# Patient Record
Sex: FEMALE | Race: OTHER | NOT HISPANIC OR LATINO | URBAN - METROPOLITAN AREA
[De-identification: names, ages, dates, MRNs, and addresses within clinical notes are randomized per-mention and may not be internally consistent; named-entity substitution may affect disease eponyms.]

---

## 2021-06-05 ENCOUNTER — INPATIENT (INPATIENT)
Facility: HOSPITAL | Age: 71
LOS: 0 days | Discharge: HOME | End: 2021-06-06
Attending: HOSPITALIST | Admitting: HOSPITALIST
Payer: MEDICARE

## 2021-06-05 VITALS
OXYGEN SATURATION: 97 % | WEIGHT: 149.91 LBS | TEMPERATURE: 98 F | RESPIRATION RATE: 18 BRPM | HEIGHT: 63 IN | HEART RATE: 108 BPM | DIASTOLIC BLOOD PRESSURE: 81 MMHG | SYSTOLIC BLOOD PRESSURE: 158 MMHG

## 2021-06-05 LAB
ALBUMIN SERPL ELPH-MCNC: 4.5 G/DL — SIGNIFICANT CHANGE UP (ref 3.5–5.2)
ALP SERPL-CCNC: 117 U/L — HIGH (ref 30–115)
ALT FLD-CCNC: 16 U/L — SIGNIFICANT CHANGE UP (ref 0–41)
ANION GAP SERPL CALC-SCNC: 17 MMOL/L — HIGH (ref 7–14)
APPEARANCE UR: CLEAR — SIGNIFICANT CHANGE UP
AST SERPL-CCNC: 34 U/L — SIGNIFICANT CHANGE UP (ref 0–41)
BASOPHILS # BLD AUTO: 0.05 K/UL — SIGNIFICANT CHANGE UP (ref 0–0.2)
BASOPHILS NFR BLD AUTO: 0.2 % — SIGNIFICANT CHANGE UP (ref 0–1)
BILIRUB DIRECT SERPL-MCNC: <0.2 MG/DL — SIGNIFICANT CHANGE UP (ref 0–0.2)
BILIRUB INDIRECT FLD-MCNC: >0.3 MG/DL — SIGNIFICANT CHANGE UP (ref 0.2–1.2)
BILIRUB SERPL-MCNC: 0.5 MG/DL — SIGNIFICANT CHANGE UP (ref 0.2–1.2)
BILIRUB UR-MCNC: NEGATIVE — SIGNIFICANT CHANGE UP
BLD GP AB SCN SERPL QL: SIGNIFICANT CHANGE UP
BUN SERPL-MCNC: 13 MG/DL — SIGNIFICANT CHANGE UP (ref 10–20)
CALCIUM SERPL-MCNC: 9.9 MG/DL — SIGNIFICANT CHANGE UP (ref 8.5–10.1)
CHLORIDE SERPL-SCNC: 101 MMOL/L — SIGNIFICANT CHANGE UP (ref 98–110)
CO2 SERPL-SCNC: 20 MMOL/L — SIGNIFICANT CHANGE UP (ref 17–32)
COLOR SPEC: SIGNIFICANT CHANGE UP
CREAT SERPL-MCNC: 0.7 MG/DL — SIGNIFICANT CHANGE UP (ref 0.7–1.5)
DIFF PNL FLD: NEGATIVE — SIGNIFICANT CHANGE UP
EOSINOPHIL # BLD AUTO: 0.01 K/UL — SIGNIFICANT CHANGE UP (ref 0–0.7)
EOSINOPHIL NFR BLD AUTO: 0 % — SIGNIFICANT CHANGE UP (ref 0–8)
GLUCOSE SERPL-MCNC: 120 MG/DL — HIGH (ref 70–99)
GLUCOSE UR QL: NEGATIVE — SIGNIFICANT CHANGE UP
HCT VFR BLD CALC: 40.3 % — SIGNIFICANT CHANGE UP (ref 37–47)
HGB BLD-MCNC: 12.5 G/DL — SIGNIFICANT CHANGE UP (ref 12–16)
IMM GRANULOCYTES NFR BLD AUTO: 0.3 % — SIGNIFICANT CHANGE UP (ref 0.1–0.3)
KETONES UR-MCNC: ABNORMAL
LACTATE SERPL-SCNC: 2.4 MMOL/L — HIGH (ref 0.7–2)
LACTATE SERPL-SCNC: 3.2 MMOL/L — HIGH (ref 0.7–2)
LEUKOCYTE ESTERASE UR-ACNC: NEGATIVE — SIGNIFICANT CHANGE UP
LIDOCAIN IGE QN: 19 U/L — SIGNIFICANT CHANGE UP (ref 7–60)
LYMPHOCYTES # BLD AUTO: 0.65 K/UL — LOW (ref 1.2–3.4)
LYMPHOCYTES # BLD AUTO: 3.2 % — LOW (ref 20.5–51.1)
MCHC RBC-ENTMCNC: 25.5 PG — LOW (ref 27–31)
MCHC RBC-ENTMCNC: 31 G/DL — LOW (ref 32–37)
MCV RBC AUTO: 82.1 FL — SIGNIFICANT CHANGE UP (ref 81–99)
MONOCYTES # BLD AUTO: 1.5 K/UL — HIGH (ref 0.1–0.6)
MONOCYTES NFR BLD AUTO: 7.3 % — SIGNIFICANT CHANGE UP (ref 1.7–9.3)
NEUTROPHILS # BLD AUTO: 18.34 K/UL — HIGH (ref 1.4–6.5)
NEUTROPHILS NFR BLD AUTO: 89 % — HIGH (ref 42.2–75.2)
NITRITE UR-MCNC: NEGATIVE — SIGNIFICANT CHANGE UP
NRBC # BLD: 0 /100 WBCS — SIGNIFICANT CHANGE UP (ref 0–0)
PH UR: 6.5 — SIGNIFICANT CHANGE UP (ref 5–8)
PLATELET # BLD AUTO: 338 K/UL — SIGNIFICANT CHANGE UP (ref 130–400)
POTASSIUM SERPL-MCNC: 5.5 MMOL/L — HIGH (ref 3.5–5)
POTASSIUM SERPL-SCNC: 5.5 MMOL/L — HIGH (ref 3.5–5)
PROT SERPL-MCNC: 7.7 G/DL — SIGNIFICANT CHANGE UP (ref 6–8)
PROT UR-MCNC: SIGNIFICANT CHANGE UP
RBC # BLD: 4.91 M/UL — SIGNIFICANT CHANGE UP (ref 4.2–5.4)
RBC # FLD: 15.9 % — HIGH (ref 11.5–14.5)
SARS-COV-2 RNA SPEC QL NAA+PROBE: SIGNIFICANT CHANGE UP
SODIUM SERPL-SCNC: 138 MMOL/L — SIGNIFICANT CHANGE UP (ref 135–146)
SP GR SPEC: 1.04 — HIGH (ref 1.01–1.03)
UROBILINOGEN FLD QL: SIGNIFICANT CHANGE UP
WBC # BLD: 20.62 K/UL — HIGH (ref 4.8–10.8)
WBC # FLD AUTO: 20.62 K/UL — HIGH (ref 4.8–10.8)

## 2021-06-05 PROCEDURE — 99285 EMERGENCY DEPT VISIT HI MDM: CPT

## 2021-06-05 PROCEDURE — 99223 1ST HOSP IP/OBS HIGH 75: CPT

## 2021-06-05 PROCEDURE — 74177 CT ABD & PELVIS W/CONTRAST: CPT | Mod: 26,MA

## 2021-06-05 RX ORDER — SODIUM CHLORIDE 9 MG/ML
1000 INJECTION, SOLUTION INTRAVENOUS ONCE
Refills: 0 | Status: COMPLETED | OUTPATIENT
Start: 2021-06-05 | End: 2021-06-05

## 2021-06-05 RX ORDER — SODIUM CHLORIDE 9 MG/ML
1000 INJECTION, SOLUTION INTRAVENOUS
Refills: 0 | Status: DISCONTINUED | OUTPATIENT
Start: 2021-06-05 | End: 2021-06-06

## 2021-06-05 RX ORDER — METRONIDAZOLE 500 MG
500 TABLET ORAL ONCE
Refills: 0 | Status: COMPLETED | OUTPATIENT
Start: 2021-06-05 | End: 2021-06-05

## 2021-06-05 RX ORDER — ACETAMINOPHEN 500 MG
650 TABLET ORAL EVERY 6 HOURS
Refills: 0 | Status: DISCONTINUED | OUTPATIENT
Start: 2021-06-05 | End: 2021-06-06

## 2021-06-05 RX ORDER — AMLODIPINE BESYLATE 2.5 MG/1
0 TABLET ORAL
Qty: 0 | Refills: 2 | DISCHARGE

## 2021-06-05 RX ORDER — MORPHINE SULFATE 50 MG/1
2 CAPSULE, EXTENDED RELEASE ORAL EVERY 4 HOURS
Refills: 0 | Status: DISCONTINUED | OUTPATIENT
Start: 2021-06-05 | End: 2021-06-06

## 2021-06-05 RX ORDER — ATORVASTATIN CALCIUM 80 MG/1
20 TABLET, FILM COATED ORAL AT BEDTIME
Refills: 0 | Status: DISCONTINUED | OUTPATIENT
Start: 2021-06-05 | End: 2021-06-06

## 2021-06-05 RX ORDER — AMLODIPINE BESYLATE 2.5 MG/1
5 TABLET ORAL DAILY
Refills: 0 | Status: DISCONTINUED | OUTPATIENT
Start: 2021-06-05 | End: 2021-06-06

## 2021-06-05 RX ORDER — METRONIDAZOLE 500 MG
500 TABLET ORAL EVERY 8 HOURS
Refills: 0 | Status: DISCONTINUED | OUTPATIENT
Start: 2021-06-05 | End: 2021-06-06

## 2021-06-05 RX ORDER — CEFTRIAXONE 500 MG/1
2000 INJECTION, POWDER, FOR SOLUTION INTRAMUSCULAR; INTRAVENOUS EVERY 24 HOURS
Refills: 0 | Status: DISCONTINUED | OUTPATIENT
Start: 2021-06-05 | End: 2021-06-06

## 2021-06-05 RX ORDER — CHOLECALCIFEROL (VITAMIN D3) 125 MCG
0 CAPSULE ORAL
Qty: 0 | Refills: 0 | DISCHARGE

## 2021-06-05 RX ORDER — ROSUVASTATIN CALCIUM 5 MG/1
0 TABLET ORAL
Qty: 0 | Refills: 1 | DISCHARGE

## 2021-06-05 RX ORDER — OMEPRAZOLE 10 MG/1
0 CAPSULE, DELAYED RELEASE ORAL
Qty: 0 | Refills: 0 | DISCHARGE

## 2021-06-05 RX ORDER — CIPROFLOXACIN LACTATE 400MG/40ML
400 VIAL (ML) INTRAVENOUS ONCE
Refills: 0 | Status: COMPLETED | OUTPATIENT
Start: 2021-06-05 | End: 2021-06-05

## 2021-06-05 RX ORDER — ENOXAPARIN SODIUM 100 MG/ML
40 INJECTION SUBCUTANEOUS DAILY
Refills: 0 | Status: DISCONTINUED | OUTPATIENT
Start: 2021-06-05 | End: 2021-06-06

## 2021-06-05 RX ORDER — MORPHINE SULFATE 50 MG/1
4 CAPSULE, EXTENDED RELEASE ORAL ONCE
Refills: 0 | Status: DISCONTINUED | OUTPATIENT
Start: 2021-06-05 | End: 2021-06-05

## 2021-06-05 RX ORDER — METOCLOPRAMIDE HCL 10 MG
10 TABLET ORAL ONCE
Refills: 0 | Status: COMPLETED | OUTPATIENT
Start: 2021-06-05 | End: 2021-06-05

## 2021-06-05 RX ORDER — PANTOPRAZOLE SODIUM 20 MG/1
40 TABLET, DELAYED RELEASE ORAL
Refills: 0 | Status: DISCONTINUED | OUTPATIENT
Start: 2021-06-05 | End: 2021-06-06

## 2021-06-05 RX ADMIN — SODIUM CHLORIDE 1000 MILLILITER(S): 9 INJECTION, SOLUTION INTRAVENOUS at 13:20

## 2021-06-05 RX ADMIN — Medication 100 MILLIGRAM(S): at 13:21

## 2021-06-05 RX ADMIN — MORPHINE SULFATE 4 MILLIGRAM(S): 50 CAPSULE, EXTENDED RELEASE ORAL at 11:55

## 2021-06-05 RX ADMIN — Medication 100 MILLIGRAM(S): at 22:52

## 2021-06-05 RX ADMIN — SODIUM CHLORIDE 1000 MILLILITER(S): 9 INJECTION, SOLUTION INTRAVENOUS at 16:31

## 2021-06-05 RX ADMIN — SODIUM CHLORIDE 1000 MILLILITER(S): 9 INJECTION, SOLUTION INTRAVENOUS at 14:20

## 2021-06-05 RX ADMIN — Medication 400 MILLIGRAM(S): at 14:20

## 2021-06-05 RX ADMIN — MORPHINE SULFATE 4 MILLIGRAM(S): 50 CAPSULE, EXTENDED RELEASE ORAL at 11:42

## 2021-06-05 RX ADMIN — ATORVASTATIN CALCIUM 20 MILLIGRAM(S): 80 TABLET, FILM COATED ORAL at 22:53

## 2021-06-05 RX ADMIN — Medication 10 MILLIGRAM(S): at 18:00

## 2021-06-05 RX ADMIN — Medication 500 MILLIGRAM(S): at 14:20

## 2021-06-05 RX ADMIN — Medication 200 MILLIGRAM(S): at 13:20

## 2021-06-05 RX ADMIN — SODIUM CHLORIDE 100 MILLILITER(S): 9 INJECTION, SOLUTION INTRAVENOUS at 22:53

## 2021-06-05 NOTE — ED PROVIDER NOTE - OBJECTIVE STATEMENT
69 yo female, pmh of htn and hld, presents to ed for abd pain, left lower abd, mild, aching, no radiation, started last night, hx of hysterectomy, a/w diarrhea. Denies fever, chills, cp, sob, nv, dysuria, hematuria.

## 2021-06-05 NOTE — H&P ADULT - ASSESSMENT
70 year old female patient presented to [facility]'s ER for LLQ abdominal pain.    # Sigmoid diverticulitis  CT Abdomen and Pelvis w/ IV Cont (06.05.21 @ 14:44) >  1.  Acute sigmoid diverticulitis. No focal drainable fluid collection or pneumoperitoneum.  2.  Grade 1/2 anterolisthesis of L4 on L5.  3.  Cholelithiasis.    - IV antibiotics: Rocephin 2g IV q24g + Metronidazole 500mg q8h  - Patient seen in the ED eating, she seemed to tolerate PO intake, kept her on clear liquids  - IV hydration with LR @ 100ml/h  - Pain management with Tylenol for mild/mod pain and morphine for severe pain  - No drainable collection, no need for surgical assessment  - Symptoms typical for diverticulitis and not cholelithiasis. This would be assessed after resolution of current etiology.    # Hypertension  - Amlodipine 5mg qd    # Dyslipidemia  - Atorvastatin 20mg qd  - Follow lipid panel in the morning    # Diet > Clear liquid  # DVT > Lovenox 40mg qd

## 2021-06-05 NOTE — H&P ADULT - NSHPPHYSICALEXAM_GEN_ALL_CORE
General: A/ox 3, No acute Distress  Neck: Supple, NO JVD  Cardiac: S1 S2 heard regular, No audible murmurs  Pulmonary: Good bilateral breath sounds, Breathing unlabored, No Rhonchi/Rales/Wheezing  Abdomen: Soft, tenderness in LLQ  Extremities: No Rashes, No edema  Neuro: A/o x 3, No focal deficits  Psch: normal mood , normal affect    T(C): 36.9 (06-05-21 @ 10:18), Max: 36.9 (06-05-21 @ 10:18)  HR: 108 (06-05-21 @ 10:18) (108 - 108)  BP: 158/81 (06-05-21 @ 10:18) (158/81 - 158/81)  RR: 18 (06-05-21 @ 10:18) (18 - 18)  SpO2: 97% (06-05-21 @ 10:18) (97% - 97%)

## 2021-06-05 NOTE — H&P ADULT - ATTENDING COMMENTS
69 YO F with a PMH of HTN, DLD, and GERD who presents to the hospital with a c/o LLQ ABD pain for the past x 1 day. Described as located in the LLQ, radiating to suprapubic region, crampy, and waxes/wanes. + N/V (non-bloody). - black/bloody stools. Denies any fevers/chills, hematemesis, rashes, flank pain, dysuria, LE swelling, headaches, or CP.     In the ED, CT-AP w/ IV contrast showed acute sigmoid diverticulitis. Pt started on IVFs (LR), anti-emetics, IV ABXs (Cipro/Flagyl), and pain meds in the ED.     FMHx: Reviewed, not relevant    Physical exam shows pt laying in bed in NAD. VSS, afebrile, not hypoxic on RA. A&Ox3. Neuro exam intact. CTA B/L with no W/C/R. RRR, no M/G/R. ABD is soft with TTP in the LLQ region, normoactive BSs. LEs with no pitting edema. No rashes. Labs and radiology as above.     LLQ ABD pain due to acute sigmoid diverticulitis without abscess/perforation; sepsis present on admission. Advance diet as tolerated. IV ABXs (Cipro/flagyl). PRN pain meds. IVFs (LR). Anti-emetics PRN. FU cultures.   -When pt is able to tolerate PO, switch to PO ABXs and discharge w/ GI out-pt FU    HAGMA (High Anion Gap Metabolic Acidosis) from lactic acidosis. IVFs (LR). Repeat lactate and BMP in the AM.     Hx of HTN, DLD, and GERD. Restart home meds, except as stated above. DVT PPX. Inform PCP of pt's admission to hospital. My note supersedes the residents note.

## 2021-06-05 NOTE — ED PROVIDER NOTE - CLINICAL SUMMARY MEDICAL DECISION MAKING FREE TEXT BOX
Patient presented with LLQ abdominal pain and diarrhea. (+) Tender on exam but otherwise afebrile, HD stable. Obtained labs which showed WBC count to 20 but otherwise grossly unremarkable including no significant anemia, signs of dehydration/CHATO, transaminitis or significant electrolyte abnormalities. UA negative for infection. CT abd/pelvis showed (+) uncomplicated diverticulitis. However, despite tx in ED, patient still reporting pain, unable to tolerate PO, does not feel well enough to go home. Spoke with patient's family member who is physician - requesting admission as patient unlikely to stay hydrated at home with her symptoms. Will admit for further monitoring. Patient and family agreeable with plan. HD stable at time of admission.

## 2021-06-05 NOTE — H&P ADULT - HISTORY OF PRESENT ILLNESS
70 year old female patient presented to [facility]'s ER for LLQ abdominal pain.    Known hypertensive on amlodipine, dyslipidemic on rosuvastatin, and GERD on omeprazole.    Current history goes back to 1 day prior to presentation when the patient had an acute onset LLQ abdominal pain, colicky in quality, severe in intensity, radiating to the suprapubic region, associated with nausa/no vomiting, chills, fatigue.   No recorded fever, no diarrhea, no blood in stool.   Presented to the ED, inflammatory biomarkers elevated, CT-abdomen showed acute sigmoid diverticulitis, admitted for management.

## 2021-06-05 NOTE — ED PROVIDER NOTE - ATTENDING CONTRIBUTION TO CARE
70 year old female, pmhx HTN, HLD, presenting with abdominal pain since last night described as achy, LLQ, non-radiating, no palliative or provocative factors, moderate severity, associated with watery non-bloody diarrhea. Denies having this before. Otherwise denies fevers, headache, vision changes, weakness/numbness, confusion, URI symptoms, neck pain, chest pain, back pain, dyspnea, cough, palpitations, nausea, vomiting, constipation, blood in stool/dark stools, urinary symptoms, vaginal bleeding/discharge, leg swelling, rash, recent travel or sick contacts.    Vital Signs: I have reviewed the initial vital signs.  Constitutional: NAD, well-nourished, appears stated age, no acute distress.  HEENT: Airway patent, moist MM, no erythema/swelling/deformity of oral structures. EOMI, PERRLA.  CV: regular rate, regular rhythm, well-perfused extremities, 2+ b/l DP and radial pulses equal.  Lungs: BCTA, no increased WOB.  ABD: (+) LLQ tenderness, no guarding or rebound, no pulsatile mass, no hernias.   MSK: Neck supple, nontender, nl ROM, no stepoff. Chest nontender. Back nontender in TLS spine or to b/l bony structures or flanks. Ext nontender, nl rom, no deformity.   INTEG: Skin warm, dry, no rash.  NEURO: A&Ox3, normal strength, nl sensation throughout, normal speech.   PSYCH: Calm, cooperative, normal affect and interaction.    Will obtain labs, CT abd/pelvis, UA, IVF, pain control PRN, re-eval.

## 2021-06-05 NOTE — ED PROVIDER NOTE - NS ED ROS FT
Constitutional: (-) fever, (-) chills  Eyes: (-) visual changes  ENT: (-) nasal congestions  Cardiovascular: (-) chest pain, (-) syncope  Respiratory: (-) cough, (-) shortness of breath, (-) dyspnea,   Gastrointestinal: (-) vomiting, (+) diarrhea, (-)nausea,  Musculoskeletal: (-) neck pain, (-) back pain, (-) joint pain,  Integumentary: (-) rash, (-) edema, (-) bruises  Peripheral Vascular: (-) leg swelling  :  (-)dysuria,  (-) hematuria  Allergic/Immunologic: (-) pruritus

## 2021-06-05 NOTE — ED PROVIDER NOTE - PHYSICAL EXAMINATION
Physical Exam    Vital Signs: I have reviewed the initial vital signs.  Constitutional: well-nourished, appears stated age, no acute distress  Eyes: Conjunctiva pink, Sclera clear,   Cardiovascular: S1 and S2, regular rate, regular rhythm, well-perfused extremities, radial pulses equal and 2+, pedal pulses 2+ and equal   Respiratory: unlabored respiratory effort, clear to auscultation bilaterally no wheezing, rales and rhonchi  Gastrointestinal: soft, llq ttp, no guarding or rebound, no pulsatile mass, normal bowl sounds  Musculoskeletal: supple neck, no lower extremity edema, no midline tenderness  Integumentary: warm, dry, no rash  Neurologic: awake, alert, nvi

## 2021-06-05 NOTE — H&P ADULT - NSHPLABSRESULTS_GEN_ALL_CORE
LABS:                        12.5   20.62 )-----------( 338      ( 05 Jun 2021 12:00 )             40.3     06-05    138  |  101  |  13  ----------------------------<  120<H>  5.5<H>   |  20  |  0.7    Ca    9.9      05 Jun 2021 12:00    TPro  7.7  /  Alb  4.5  /  TBili  0.5  /  DBili  <0.2  /  AST  34  /  ALT  16  /  AlkPhos  117<H>  06-05      Bilirubin: Negative (06-05-21 @ 15:17)  Aspartate Aminotransferase (AST/SGOT): 34 U/L (06-05-21 @ 12:00)  Alanine Aminotransferase (ALT/SGPT): 16 U/L (06-05-21 @ 12:00)

## 2021-06-06 VITALS
RESPIRATION RATE: 20 BRPM | TEMPERATURE: 98 F | DIASTOLIC BLOOD PRESSURE: 76 MMHG | SYSTOLIC BLOOD PRESSURE: 127 MMHG | HEART RATE: 89 BPM

## 2021-06-06 LAB
ALBUMIN SERPL ELPH-MCNC: 3.6 G/DL — SIGNIFICANT CHANGE UP (ref 3.5–5.2)
ALP SERPL-CCNC: 86 U/L — SIGNIFICANT CHANGE UP (ref 30–115)
ALT FLD-CCNC: 12 U/L — SIGNIFICANT CHANGE UP (ref 0–41)
ANION GAP SERPL CALC-SCNC: 10 MMOL/L — SIGNIFICANT CHANGE UP (ref 7–14)
AST SERPL-CCNC: 17 U/L — SIGNIFICANT CHANGE UP (ref 0–41)
BASOPHILS # BLD AUTO: 0.05 K/UL — SIGNIFICANT CHANGE UP (ref 0–0.2)
BASOPHILS # BLD AUTO: 0.06 K/UL — SIGNIFICANT CHANGE UP (ref 0–0.2)
BASOPHILS NFR BLD AUTO: 0.4 % — SIGNIFICANT CHANGE UP (ref 0–1)
BASOPHILS NFR BLD AUTO: 0.6 % — SIGNIFICANT CHANGE UP (ref 0–1)
BILIRUB SERPL-MCNC: 0.4 MG/DL — SIGNIFICANT CHANGE UP (ref 0.2–1.2)
BUN SERPL-MCNC: 9 MG/DL — LOW (ref 10–20)
CALCIUM SERPL-MCNC: 8.8 MG/DL — SIGNIFICANT CHANGE UP (ref 8.5–10.1)
CHLORIDE SERPL-SCNC: 107 MMOL/L — SIGNIFICANT CHANGE UP (ref 98–110)
CHOLEST SERPL-MCNC: 157 MG/DL — SIGNIFICANT CHANGE UP
CO2 SERPL-SCNC: 25 MMOL/L — SIGNIFICANT CHANGE UP (ref 17–32)
CREAT SERPL-MCNC: 0.5 MG/DL — LOW (ref 0.7–1.5)
CULTURE RESULTS: SIGNIFICANT CHANGE UP
EOSINOPHIL # BLD AUTO: 0.07 K/UL — SIGNIFICANT CHANGE UP (ref 0–0.7)
EOSINOPHIL # BLD AUTO: 0.12 K/UL — SIGNIFICANT CHANGE UP (ref 0–0.7)
EOSINOPHIL NFR BLD AUTO: 0.6 % — SIGNIFICANT CHANGE UP (ref 0–8)
EOSINOPHIL NFR BLD AUTO: 1.1 % — SIGNIFICANT CHANGE UP (ref 0–8)
GLUCOSE SERPL-MCNC: 104 MG/DL — HIGH (ref 70–99)
HCT VFR BLD CALC: 32 % — LOW (ref 37–47)
HCT VFR BLD CALC: 34.7 % — LOW (ref 37–47)
HDLC SERPL-MCNC: 51 MG/DL — SIGNIFICANT CHANGE UP
HGB BLD-MCNC: 10.3 G/DL — LOW (ref 12–16)
HGB BLD-MCNC: 11 G/DL — LOW (ref 12–16)
IMM GRANULOCYTES NFR BLD AUTO: 0.3 % — SIGNIFICANT CHANGE UP (ref 0.1–0.3)
IMM GRANULOCYTES NFR BLD AUTO: 0.3 % — SIGNIFICANT CHANGE UP (ref 0.1–0.3)
LACTATE SERPL-SCNC: 0.8 MMOL/L — SIGNIFICANT CHANGE UP (ref 0.7–2)
LIPID PNL WITH DIRECT LDL SERPL: 92 MG/DL — SIGNIFICANT CHANGE UP
LYMPHOCYTES # BLD AUTO: 2.32 K/UL — SIGNIFICANT CHANGE UP (ref 1.2–3.4)
LYMPHOCYTES # BLD AUTO: 2.82 K/UL — SIGNIFICANT CHANGE UP (ref 1.2–3.4)
LYMPHOCYTES # BLD AUTO: 20.8 % — SIGNIFICANT CHANGE UP (ref 20.5–51.1)
LYMPHOCYTES # BLD AUTO: 26.8 % — SIGNIFICANT CHANGE UP (ref 20.5–51.1)
MAGNESIUM SERPL-MCNC: 1.8 MG/DL — SIGNIFICANT CHANGE UP (ref 1.8–2.4)
MCHC RBC-ENTMCNC: 25.7 PG — LOW (ref 27–31)
MCHC RBC-ENTMCNC: 25.8 PG — LOW (ref 27–31)
MCHC RBC-ENTMCNC: 31.7 G/DL — LOW (ref 32–37)
MCHC RBC-ENTMCNC: 32.2 G/DL — SIGNIFICANT CHANGE UP (ref 32–37)
MCV RBC AUTO: 80 FL — LOW (ref 81–99)
MCV RBC AUTO: 81.1 FL — SIGNIFICANT CHANGE UP (ref 81–99)
MONOCYTES # BLD AUTO: 1.06 K/UL — HIGH (ref 0.1–0.6)
MONOCYTES # BLD AUTO: 1.13 K/UL — HIGH (ref 0.1–0.6)
MONOCYTES NFR BLD AUTO: 10.1 % — HIGH (ref 1.7–9.3)
MONOCYTES NFR BLD AUTO: 10.2 % — HIGH (ref 1.7–9.3)
NEUTROPHILS # BLD AUTO: 6.42 K/UL — SIGNIFICANT CHANGE UP (ref 1.4–6.5)
NEUTROPHILS # BLD AUTO: 7.53 K/UL — HIGH (ref 1.4–6.5)
NEUTROPHILS NFR BLD AUTO: 61.1 % — SIGNIFICANT CHANGE UP (ref 42.2–75.2)
NEUTROPHILS NFR BLD AUTO: 67.7 % — SIGNIFICANT CHANGE UP (ref 42.2–75.2)
NON HDL CHOLESTEROL: 106 MG/DL — SIGNIFICANT CHANGE UP
NRBC # BLD: 0 /100 WBCS — SIGNIFICANT CHANGE UP (ref 0–0)
NRBC # BLD: 0 /100 WBCS — SIGNIFICANT CHANGE UP (ref 0–0)
PLATELET # BLD AUTO: 297 K/UL — SIGNIFICANT CHANGE UP (ref 130–400)
PLATELET # BLD AUTO: 303 K/UL — SIGNIFICANT CHANGE UP (ref 130–400)
POTASSIUM SERPL-MCNC: 3.8 MMOL/L — SIGNIFICANT CHANGE UP (ref 3.5–5)
POTASSIUM SERPL-SCNC: 3.8 MMOL/L — SIGNIFICANT CHANGE UP (ref 3.5–5)
PROT SERPL-MCNC: 5.8 G/DL — LOW (ref 6–8)
RBC # BLD: 4 M/UL — LOW (ref 4.2–5.4)
RBC # BLD: 4.28 M/UL — SIGNIFICANT CHANGE UP (ref 4.2–5.4)
RBC # FLD: 15.9 % — HIGH (ref 11.5–14.5)
RBC # FLD: 15.9 % — HIGH (ref 11.5–14.5)
SODIUM SERPL-SCNC: 142 MMOL/L — SIGNIFICANT CHANGE UP (ref 135–146)
SPECIMEN SOURCE: SIGNIFICANT CHANGE UP
TRIGL SERPL-MCNC: 72 MG/DL — SIGNIFICANT CHANGE UP
WBC # BLD: 10.51 K/UL — SIGNIFICANT CHANGE UP (ref 4.8–10.8)
WBC # BLD: 11.13 K/UL — HIGH (ref 4.8–10.8)
WBC # FLD AUTO: 10.51 K/UL — SIGNIFICANT CHANGE UP (ref 4.8–10.8)
WBC # FLD AUTO: 11.13 K/UL — HIGH (ref 4.8–10.8)

## 2021-06-06 PROCEDURE — 99239 HOSP IP/OBS DSCHRG MGMT >30: CPT

## 2021-06-06 RX ORDER — CEFPODOXIME PROXETIL 100 MG
1 TABLET ORAL
Qty: 20 | Refills: 0
Start: 2021-06-06 | End: 2021-06-15

## 2021-06-06 RX ORDER — METRONIDAZOLE 500 MG
1 TABLET ORAL
Qty: 30 | Refills: 0
Start: 2021-06-06 | End: 2021-06-15

## 2021-06-06 RX ADMIN — CEFTRIAXONE 100 MILLIGRAM(S): 500 INJECTION, POWDER, FOR SOLUTION INTRAMUSCULAR; INTRAVENOUS at 06:22

## 2021-06-06 RX ADMIN — Medication 100 MILLIGRAM(S): at 06:22

## 2021-06-06 RX ADMIN — Medication 100 MILLIGRAM(S): at 14:00

## 2021-06-06 RX ADMIN — AMLODIPINE BESYLATE 5 MILLIGRAM(S): 2.5 TABLET ORAL at 06:22

## 2021-06-06 RX ADMIN — PANTOPRAZOLE SODIUM 40 MILLIGRAM(S): 20 TABLET, DELAYED RELEASE ORAL at 06:24

## 2021-06-06 NOTE — DISCHARGE NOTE PROVIDER - CARE PROVIDERS DIRECT ADDRESSES
,DirectAddress_Unknown ,DirectAddress_Unknown,[email or web address]

## 2021-06-06 NOTE — DISCHARGE NOTE NURSING/CASE MANAGEMENT/SOCIAL WORK - PATIENT PORTAL LINK FT
You can access the FollowMyHealth Patient Portal offered by [facility] by registering at the following website: [email or web address]. By joining [facility]’s FollowMyHealth portal, you will also be able to view your health information using other applications (apps) compatible with our system.

## 2021-06-06 NOTE — DISCHARGE NOTE PROVIDER - NSDCMRMEDTOKEN_GEN_ALL_CORE_FT
AMLODIPINE BESYLATE 5MG TABLETS:   cefpodoxime 200 mg oral tablet: 1 tab(s) orally 2 times a day   Flagyl 500 mg oral tablet: 1 tab(s) orally every 8 hours   OMEPRAZOLE 20 MG CPDR:   ROSUVASTATIN 5MG TABLETS:   VITAMIN D3 41718 IU (DACIA) CAP: TK 1 C PO 1 TIME A WK

## 2021-06-06 NOTE — DISCHARGE NOTE PROVIDER - CARE PROVIDER_API CALL
Bell Rodrigues  Phone: (   )    -  Fax: (   )    -  Follow Up Time: 1 week   Bell Rodrigues  Phone: (   )    -  Fax: (   )    -  Follow Up Time: 1 week    Jennifer Velásquez)  Gastroenterology  [facility] [place] [address]  Phone: [phone]  Fax: [phone]  Follow Up Time: 1 month

## 2021-06-06 NOTE — DISCHARGE NOTE PROVIDER - NSDCCPCAREPLAN_GEN_ALL_CORE_FT
PRINCIPAL DISCHARGE DIAGNOSIS  Diagnosis: Diverticulitis  Assessment and Plan of Treatment: You were admitted to the hospital and found to have diverticulitis, an inflamation of the colon . A CT scan of your abdomen showed acute sigmoid diverticulitis. No focal drainable fluid collection or pneumoperitoneum. You were treated with IV antibiotics and IV fluids.   Two antibiotics were sent to the pharmacy. Please complete the full course of these antibiotics and follow up with yout primary care doctor within 1 - 2 weeks.   If you have worsening abdominal pain, inability to tolerate food/beverage, diarrhea, blood in the stool, fevers, chills, or nausea, please report to the emergency room for evaluation.

## 2021-06-06 NOTE — CHART NOTE - NSCHARTNOTEFT_GEN_A_CORE
RESIDENT DISCHARGE NOTE     Patient Name: BJ GRAHAM  MRN-[id number]    VITAL SIGNS:  T(F): 98 (06-06-21 @ 13:51), Max: 100 (06-05-21 @ 22:07)  HR: 89 (06-06-21 @ 13:51)  BP: 127/76 (06-06-21 @ 13:51)  SpO2: 99% (06-05-21 @ 20:55)  Weight (kg): 68.4 (06-05-21 @ 22:07)  BMI (kg/m2): 26.7 (06-05-21 @ 22:07)    PHYSICAL EXAMINATION:  CONSTITUTIONAL: No acute distress, well-developed, well-groomed, AAOx3  HEAD: Atraumatic, normocephalic  PULMONARY: Clear to auscultation bilaterally  CARDIOVASCULAR: Regular rate and rhythm  GASTROINTESTINAL: Soft, non-tender, non-distended  MUSCULOSKELETAL: 2+ peripheral pulses; no clubbing, no cyanosis, no edema  NEUROLOGY: non-focal  SKIN: warm and dry    TEST RESULTS:                        11.0   10.51 )-----------( 297      ( 06 Jun 2021 10:35 )             34.7     06-06    142  |  107  |  9<L>  ----------------------------<  104<H>  3.8   |  25  |  0.5<L>    Ca    8.8      06 Jun 2021 06:13  Mg     1.8     06-06    TPro  5.8<L>  /  Alb  3.6  /  TBili  0.4  /  DBili  x   /  AST  17  /  ALT  12  /  AlkPhos  86  06-06      FINAL DISCHARGE INTERVIEW:  Resident Present: Lexy Tapia M.D.    DISCHARGE MEDICATION RECONCILIATION:  Reviewed with Attending (Name: Dr. Eddi Wharton )    DISPOSITION:  [ X ] Home  [  ] Home with Visiting Nursing Services  [  ] SNF/ NH  [  ] Acute Rehab (4A)  [  ] Other (Specify: _____ )

## 2021-06-06 NOTE — DISCHARGE NOTE PROVIDER - HOSPITAL COURSE
70 year old female patient presented to [facility]'s ER for LLQ abdominal pain. Known hypertensive on amlodipine, dyslipidemic on rosuvastatin, and GERD on omeprazole.Current history goes back to 1 day prior to presentation when the patient had an acute onset LLQ abdominal pain, colicky in quality, severe in intensity, radiating to the suprapubic region, associated with nausa/no vomiting, chills, fatigue. No recorded fever, no diarrhea, no blood in stool.  Presented to the ED, inflammatory biomarkers elevated, CT-abdomen showed acute sigmoid diverticulitis, admitted for management. CT scan showed diverticulitis with no drainable collection. Treated with Rocephin and flagyl. Discharge on PO vantin & flagyl 10 day course. Discharge planning discussed with primary attending.

## 2021-06-06 NOTE — DISCHARGE NOTE PROVIDER - PROVIDER TOKENS
FREE:[LAST:[Ravi],FIRST:[Bell],PHONE:[(   )    -],FAX:[(   )    -],FOLLOWUP:[1 week]] FREE:[LAST:[Ravi],FIRST:[Bell],PHONE:[(   )    -],FAX:[(   )    -],FOLLOWUP:[1 week]],PROVIDER:[TOKEN:[[id number]],FOLLOWUP:[1 month]]

## 2021-06-07 LAB
A1C WITH ESTIMATED AVERAGE GLUCOSE RESULT: 6.2 % — HIGH (ref 4–5.6)
COVID-19 SPIKE DOMAIN AB INTERP: POSITIVE
COVID-19 SPIKE DOMAIN ANTIBODY RESULT: >250 U/ML — HIGH
ESTIMATED AVERAGE GLUCOSE: 131 MG/DL — HIGH (ref 68–114)
HCV AB S/CO SERPL IA: 0.03 COI — SIGNIFICANT CHANGE UP
HCV AB SERPL-IMP: SIGNIFICANT CHANGE UP
SARS-COV-2 IGG+IGM SERPL QL IA: >250 U/ML — HIGH
SARS-COV-2 IGG+IGM SERPL QL IA: POSITIVE

## 2021-06-15 DIAGNOSIS — K21.9 GASTRO-ESOPHAGEAL REFLUX DISEASE WITHOUT ESOPHAGITIS: ICD-10-CM

## 2021-06-15 DIAGNOSIS — Z92.22 PERSONAL HISTORY OF MONOCLONAL DRUG THERAPY: ICD-10-CM

## 2021-06-15 DIAGNOSIS — M43.16 SPONDYLOLISTHESIS, LUMBAR REGION: ICD-10-CM

## 2021-06-15 DIAGNOSIS — R10.9 UNSPECIFIED ABDOMINAL PAIN: ICD-10-CM

## 2021-06-15 DIAGNOSIS — E87.2 ACIDOSIS: ICD-10-CM

## 2021-06-15 DIAGNOSIS — I10 ESSENTIAL (PRIMARY) HYPERTENSION: ICD-10-CM

## 2021-06-15 DIAGNOSIS — E78.5 HYPERLIPIDEMIA, UNSPECIFIED: ICD-10-CM

## 2021-06-15 DIAGNOSIS — K57.32 DIVERTICULITIS OF LARGE INTESTINE WITHOUT PERFORATION OR ABSCESS WITHOUT BLEEDING: ICD-10-CM
